# Patient Record
Sex: MALE | URBAN - METROPOLITAN AREA
[De-identification: names, ages, dates, MRNs, and addresses within clinical notes are randomized per-mention and may not be internally consistent; named-entity substitution may affect disease eponyms.]

---

## 2018-02-13 ENCOUNTER — APPOINTMENT (RX ONLY)
Dept: URBAN - METROPOLITAN AREA CLINIC 87 | Facility: CLINIC | Age: 68
Setting detail: DERMATOLOGY
End: 2018-02-13

## 2018-02-13 DIAGNOSIS — Z48.817 ENCOUNTER FOR SURGICAL AFTERCARE FOLLOWING SURGERY ON THE SKIN AND SUBCUTANEOUS TISSUE: ICD-10-CM

## 2018-02-13 PROCEDURE — ? COUNSELING

## 2018-02-13 PROCEDURE — ? POST-OP WOUND EVALUATION

## 2018-02-13 ASSESSMENT — LOCATION ZONE DERM: LOCATION ZONE: EAR

## 2018-02-13 ASSESSMENT — LOCATION DETAILED DESCRIPTION DERM: LOCATION DETAILED: LEFT CYMBA CONCHA

## 2018-02-13 ASSESSMENT — LOCATION SIMPLE DESCRIPTION DERM: LOCATION SIMPLE: LEFT EAR

## 2018-02-13 NOTE — PROCEDURE: POST-OP WOUND EVALUATION
Sutures?: intact
Wound Diameter In Cm(Optional): 0
Add 13953 Cpt? (Important Note: In 2017 The Use Of 46403 Is Being Tracked By Cms To Determine Future Global Period Reimbursement For Global Periods): no
Wound Crusting?: clean
Follow Up Units (Optional): -
Detail Level: Detailed

## 2018-02-14 ENCOUNTER — APPOINTMENT (RX ONLY)
Dept: URBAN - METROPOLITAN AREA CLINIC 87 | Facility: CLINIC | Age: 68
Setting detail: DERMATOLOGY
End: 2018-02-14

## 2018-02-14 DIAGNOSIS — L57.0 ACTINIC KERATOSIS: ICD-10-CM

## 2018-02-14 DIAGNOSIS — Z85.828 PERSONAL HISTORY OF OTHER MALIGNANT NEOPLASM OF SKIN: ICD-10-CM

## 2018-02-14 DIAGNOSIS — L82.1 OTHER SEBORRHEIC KERATOSIS: ICD-10-CM

## 2018-02-14 DIAGNOSIS — L90.5 SCAR CONDITIONS AND FIBROSIS OF SKIN: ICD-10-CM

## 2018-02-14 PROCEDURE — 17000 DESTRUCT PREMALG LESION: CPT

## 2018-02-14 PROCEDURE — 17003 DESTRUCT PREMALG LES 2-14: CPT

## 2018-02-14 PROCEDURE — ? LIQUID NITROGEN

## 2018-02-14 PROCEDURE — ? COUNSELING

## 2018-02-14 PROCEDURE — 99213 OFFICE O/P EST LOW 20 MIN: CPT | Mod: 25

## 2018-02-14 ASSESSMENT — LOCATION ZONE DERM
LOCATION ZONE: EAR
LOCATION ZONE: ARM
LOCATION ZONE: FACE

## 2018-02-14 ASSESSMENT — LOCATION DETAILED DESCRIPTION DERM
LOCATION DETAILED: LEFT MEDIAL ZYGOMA
LOCATION DETAILED: RIGHT TRIANGULAR FOSSA
LOCATION DETAILED: RIGHT INFERIOR FOREHEAD
LOCATION DETAILED: RIGHT CAVUM CONCHA
LOCATION DETAILED: LEFT DISTAL DORSAL FOREARM
LOCATION DETAILED: RIGHT DISTAL DORSAL FOREARM
LOCATION DETAILED: LEFT ANTIHELIX

## 2018-02-14 ASSESSMENT — LOCATION SIMPLE DESCRIPTION DERM
LOCATION SIMPLE: LEFT ZYGOMA
LOCATION SIMPLE: RIGHT EAR
LOCATION SIMPLE: RIGHT FOREHEAD
LOCATION SIMPLE: LEFT FOREARM
LOCATION SIMPLE: RIGHT FOREARM
LOCATION SIMPLE: LEFT EAR

## 2018-02-14 NOTE — PROCEDURE: LIQUID NITROGEN
Post-Care Instructions: I reviewed with the patient in detail post-care instructions. Patient is to wear sunprotection, and avoid picking at any of the treated lesions. Pt may apply Vaseline to crusted or scabbing areas.
Consent: The patient's consent was obtained including but not limited to risks of crusting, scabbing, blistering, scarring, darker or lighter pigmentary change, recurrence, incomplete removal and infection.
Detail Level: Zone
Total Number Of Aks Treated: 5
Render Post-Care Instructions In Note?: no
Duration Of Freeze Thaw-Cycle (Seconds): 0

## 2018-10-23 ENCOUNTER — APPOINTMENT (RX ONLY)
Dept: URBAN - METROPOLITAN AREA CLINIC 87 | Facility: CLINIC | Age: 68
Setting detail: DERMATOLOGY
End: 2018-10-23

## 2018-10-23 DIAGNOSIS — L57.0 ACTINIC KERATOSIS: ICD-10-CM

## 2018-10-23 DIAGNOSIS — D22 MELANOCYTIC NEVI: ICD-10-CM

## 2018-10-23 DIAGNOSIS — Z85.828 PERSONAL HISTORY OF OTHER MALIGNANT NEOPLASM OF SKIN: ICD-10-CM

## 2018-10-23 DIAGNOSIS — L91.8 OTHER HYPERTROPHIC DISORDERS OF THE SKIN: ICD-10-CM

## 2018-10-23 DIAGNOSIS — L57.8 OTHER SKIN CHANGES DUE TO CHRONIC EXPOSURE TO NONIONIZING RADIATION: ICD-10-CM

## 2018-10-23 DIAGNOSIS — L82.1 OTHER SEBORRHEIC KERATOSIS: ICD-10-CM

## 2018-10-23 DIAGNOSIS — D18.0 HEMANGIOMA: ICD-10-CM

## 2018-10-23 DIAGNOSIS — L81.4 OTHER MELANIN HYPERPIGMENTATION: ICD-10-CM

## 2018-10-23 DIAGNOSIS — Z71.89 OTHER SPECIFIED COUNSELING: ICD-10-CM

## 2018-10-23 PROBLEM — D22.62 MELANOCYTIC NEVI OF LEFT UPPER LIMB, INCLUDING SHOULDER: Status: ACTIVE | Noted: 2018-10-23

## 2018-10-23 PROBLEM — D18.01 HEMANGIOMA OF SKIN AND SUBCUTANEOUS TISSUE: Status: ACTIVE | Noted: 2018-10-23

## 2018-10-23 PROBLEM — D48.5 NEOPLASM OF UNCERTAIN BEHAVIOR OF SKIN: Status: ACTIVE | Noted: 2018-10-23

## 2018-10-23 PROCEDURE — ? PRESCRIPTION

## 2018-10-23 PROCEDURE — ? DEFER

## 2018-10-23 PROCEDURE — ? COUNSELING

## 2018-10-23 PROCEDURE — ? SUNSCREEN RECOMMENDATIONS

## 2018-10-23 PROCEDURE — 99214 OFFICE O/P EST MOD 30 MIN: CPT

## 2018-10-23 RX ORDER — INGENOL MEBUTATE 150 UG/G
GEL TOPICAL
Qty: 1 | Refills: 0 | Status: ERX | COMMUNITY
Start: 2018-10-23

## 2018-10-23 RX ADMIN — INGENOL MEBUTATE: 150 GEL TOPICAL at 00:00

## 2018-10-23 ASSESSMENT — LOCATION DETAILED DESCRIPTION DERM
LOCATION DETAILED: LEFT ULNAR DORSAL HAND
LOCATION DETAILED: LEFT ULNAR PALM
LOCATION DETAILED: RIGHT SUPERIOR ANTERIOR NECK
LOCATION DETAILED: LEFT RADIAL DORSAL HAND
LOCATION DETAILED: RIGHT LATERAL UPPER BACK
LOCATION DETAILED: INFERIOR MID FOREHEAD
LOCATION DETAILED: LEFT ANTERIOR PROXIMAL THIGH

## 2018-10-23 ASSESSMENT — LOCATION ZONE DERM
LOCATION ZONE: LEG
LOCATION ZONE: HAND
LOCATION ZONE: NECK
LOCATION ZONE: TRUNK
LOCATION ZONE: FACE

## 2018-10-23 ASSESSMENT — LOCATION SIMPLE DESCRIPTION DERM
LOCATION SIMPLE: LEFT THIGH
LOCATION SIMPLE: LEFT HAND
LOCATION SIMPLE: RIGHT ANTERIOR NECK
LOCATION SIMPLE: RIGHT UPPER BACK
LOCATION SIMPLE: INFERIOR FOREHEAD

## 2018-10-23 NOTE — PROCEDURE: DEFER
Procedure To Be Performed At Next Visit: Biopsy by shave method
Detail Level: Detailed
Instructions (Optional): 1.4
Instructions (Optional): 0.6

## 2018-10-26 RX ORDER — INGENOL MEBUTATE 150 UG/G
GEL TOPICAL
Qty: 1 | Refills: 0 | Status: ERX

## 2018-11-13 ENCOUNTER — APPOINTMENT (RX ONLY)
Dept: URBAN - METROPOLITAN AREA CLINIC 87 | Facility: CLINIC | Age: 68
Setting detail: DERMATOLOGY
End: 2018-11-13

## 2018-11-13 DIAGNOSIS — L57.0 ACTINIC KERATOSIS: ICD-10-CM

## 2018-11-13 DIAGNOSIS — D22 MELANOCYTIC NEVI: ICD-10-CM

## 2018-11-13 PROBLEM — D48.5 NEOPLASM OF UNCERTAIN BEHAVIOR OF SKIN: Status: ACTIVE | Noted: 2018-11-13

## 2018-11-13 PROCEDURE — ? BIOPSY BY SHAVE METHOD

## 2018-11-13 PROCEDURE — 11101: CPT

## 2018-11-13 PROCEDURE — 99213 OFFICE O/P EST LOW 20 MIN: CPT | Mod: 25

## 2018-11-13 PROCEDURE — 11100: CPT

## 2018-11-13 PROCEDURE — ? COUNSELING

## 2018-11-13 PROCEDURE — ? TREATMENT REGIMEN

## 2018-11-13 ASSESSMENT — LOCATION DETAILED DESCRIPTION DERM
LOCATION DETAILED: RIGHT LATERAL UPPER BACK
LOCATION DETAILED: LEFT ANTERIOR PROXIMAL THIGH
LOCATION DETAILED: LEFT MEDIAL FOREHEAD

## 2018-11-13 ASSESSMENT — LOCATION ZONE DERM
LOCATION ZONE: LEG
LOCATION ZONE: FACE
LOCATION ZONE: TRUNK

## 2018-11-13 ASSESSMENT — LOCATION SIMPLE DESCRIPTION DERM
LOCATION SIMPLE: LEFT THIGH
LOCATION SIMPLE: RIGHT UPPER BACK
LOCATION SIMPLE: LEFT FOREHEAD

## 2018-11-13 NOTE — PROCEDURE: BIOPSY BY SHAVE METHOD
Curettage Text: The wound bed was treated with curettage after the biopsy was performed.
X Size Of Lesion In Cm: 0
Post-Care Instructions: I reviewed with the patient in detail post-care instructions. Patient is to keep the biopsy site dry overnight, and then clean with gentle soap and water and apply Vaseline or aquaphor twice daily until healed. Patient may apply hydrogen peroxide soaks to remove any crusting.
Type Of Destruction Used: Curettage
Biopsy Method: double edge Personna blade
Anesthesia Volume In Cc (Will Not Render If 0): 0.5
Cryotherapy Text: The wound bed was treated with cryotherapy after the biopsy was performed.
Consent: Written consent was obtained and risks were reviewed including but not limited to scarring, infection, bleeding, scabbing, incomplete removal, nerve damage and allergy to anesthesia.
Dressing: Band-Aid
Electrodesiccation Text: The wound bed was treated with electrodesiccation after the biopsy was performed.
Biopsy Type: H and E
Hemostasis: Aluminum Chloride
Bill For Surgical Tray: no
Depth Of Biopsy: dermis
Electrodesiccation And Curettage Text: The wound bed was treated with electrodesiccation and curettage after the biopsy was performed.
Was A Bandage Applied: Yes
Silver Nitrate Text: The wound bed was treated with silver nitrate after the biopsy was performed.
Notification Instructions: Patient will be notified of biopsy results. However, patient instructed to call the office if not contacted within 2 weeks.
Size Of Lesion In Cm: 0.6
Detail Level: Detailed
Billing Type: Third-Party Bill
Anesthesia Type: 1% lidocaine with epinephrine
Wound Care: Petrolatum
Size Of Lesion In Cm: 1.4

## 2018-11-27 ENCOUNTER — APPOINTMENT (RX ONLY)
Dept: URBAN - METROPOLITAN AREA CLINIC 87 | Facility: CLINIC | Age: 68
Setting detail: DERMATOLOGY
End: 2018-11-27

## 2018-11-27 DIAGNOSIS — L82.1 OTHER SEBORRHEIC KERATOSIS: ICD-10-CM

## 2018-11-27 DIAGNOSIS — L57.0 ACTINIC KERATOSIS: ICD-10-CM

## 2018-11-27 PROCEDURE — 17003 DESTRUCT PREMALG LES 2-14: CPT

## 2018-11-27 PROCEDURE — ? COUNSELING

## 2018-11-27 PROCEDURE — 99212 OFFICE O/P EST SF 10 MIN: CPT | Mod: 25

## 2018-11-27 PROCEDURE — ? LIQUID NITROGEN

## 2018-11-27 PROCEDURE — 17000 DESTRUCT PREMALG LESION: CPT

## 2018-11-27 ASSESSMENT — LOCATION SIMPLE DESCRIPTION DERM
LOCATION SIMPLE: LEFT EAR
LOCATION SIMPLE: RIGHT FOREHEAD
LOCATION SIMPLE: LEFT TEMPLE
LOCATION SIMPLE: RIGHT TEMPLE
LOCATION SIMPLE: LEFT FOREHEAD

## 2018-11-27 ASSESSMENT — LOCATION DETAILED DESCRIPTION DERM
LOCATION DETAILED: LEFT INFERIOR LATERAL FOREHEAD
LOCATION DETAILED: RIGHT INFERIOR TEMPLE
LOCATION DETAILED: LEFT MID TEMPLE
LOCATION DETAILED: RIGHT FOREHEAD
LOCATION DETAILED: LEFT EXTERNAL AUDITORY CANAL
LOCATION DETAILED: LEFT CENTRAL TEMPLE

## 2018-11-27 ASSESSMENT — LOCATION ZONE DERM
LOCATION ZONE: EAR
LOCATION ZONE: FACE

## 2018-12-18 ENCOUNTER — APPOINTMENT (RX ONLY)
Dept: URBAN - METROPOLITAN AREA CLINIC 87 | Facility: CLINIC | Age: 68
Setting detail: DERMATOLOGY
End: 2018-12-18

## 2018-12-18 DIAGNOSIS — L57.0 ACTINIC KERATOSIS: ICD-10-CM

## 2018-12-18 PROCEDURE — ? DEFER

## 2018-12-18 PROCEDURE — 99212 OFFICE O/P EST SF 10 MIN: CPT

## 2018-12-18 PROCEDURE — ? TREATMENT REGIMEN

## 2018-12-18 PROCEDURE — ? COUNSELING

## 2018-12-18 NOTE — PROCEDURE: TREATMENT REGIMEN
Detail Level: Zone
Plan: The patient was treating topically with Picato on the face once daily at night for 2 days 6 weeks ago for actinic damage and denies complications with the treatment. The patient was advised no further treatment is necessary at present time upon evaluation no residual scaly papule were seen.

## 2018-12-18 NOTE — PROCEDURE: DEFER
Procedure To Be Performed At Next Visit: Liquid nitrogen
Detail Level: Detailed
Instructions (Optional): Left temple and nose exhibits residual erythematous scaly papules

## 2019-11-12 ENCOUNTER — APPOINTMENT (RX ONLY)
Dept: URBAN - METROPOLITAN AREA CLINIC 87 | Facility: CLINIC | Age: 69
Setting detail: DERMATOLOGY
End: 2019-11-12

## 2019-11-12 DIAGNOSIS — Z71.89 OTHER SPECIFIED COUNSELING: ICD-10-CM

## 2019-11-12 DIAGNOSIS — L82.1 OTHER SEBORRHEIC KERATOSIS: ICD-10-CM

## 2019-11-12 DIAGNOSIS — L81.4 OTHER MELANIN HYPERPIGMENTATION: ICD-10-CM

## 2019-11-12 DIAGNOSIS — L57.8 OTHER SKIN CHANGES DUE TO CHRONIC EXPOSURE TO NONIONIZING RADIATION: ICD-10-CM

## 2019-11-12 DIAGNOSIS — L57.0 ACTINIC KERATOSIS: ICD-10-CM

## 2019-11-12 DIAGNOSIS — Z85.828 PERSONAL HISTORY OF OTHER MALIGNANT NEOPLASM OF SKIN: ICD-10-CM

## 2019-11-12 DIAGNOSIS — D22 MELANOCYTIC NEVI: ICD-10-CM

## 2019-11-12 DIAGNOSIS — D18.0 HEMANGIOMA: ICD-10-CM

## 2019-11-12 PROBLEM — D22.62 MELANOCYTIC NEVI OF LEFT UPPER LIMB, INCLUDING SHOULDER: Status: ACTIVE | Noted: 2019-11-12

## 2019-11-12 PROBLEM — D18.01 HEMANGIOMA OF SKIN AND SUBCUTANEOUS TISSUE: Status: ACTIVE | Noted: 2019-11-12

## 2019-11-12 PROCEDURE — ? LIQUID NITROGEN

## 2019-11-12 PROCEDURE — 99214 OFFICE O/P EST MOD 30 MIN: CPT | Mod: 25

## 2019-11-12 PROCEDURE — ? PRESCRIPTION

## 2019-11-12 PROCEDURE — ? SUNSCREEN RECOMMENDATIONS

## 2019-11-12 PROCEDURE — 17003 DESTRUCT PREMALG LES 2-14: CPT

## 2019-11-12 PROCEDURE — 17000 DESTRUCT PREMALG LESION: CPT

## 2019-11-12 PROCEDURE — ? COUNSELING

## 2019-11-12 PROCEDURE — ? INVENTORY

## 2019-11-12 RX ORDER — INGENOL MEBUTATE 150 UG/G
GEL TOPICAL
Qty: 1 | Refills: 1 | Status: ERX

## 2019-11-12 ASSESSMENT — LOCATION SIMPLE DESCRIPTION DERM
LOCATION SIMPLE: RIGHT FOREARM
LOCATION SIMPLE: LEFT HAND
LOCATION SIMPLE: LEFT CHEEK

## 2019-11-12 ASSESSMENT — LOCATION ZONE DERM
LOCATION ZONE: FACE
LOCATION ZONE: ARM
LOCATION ZONE: HAND

## 2019-11-12 ASSESSMENT — LOCATION DETAILED DESCRIPTION DERM
LOCATION DETAILED: RIGHT PROXIMAL DORSAL FOREARM
LOCATION DETAILED: LEFT RADIAL DORSAL HAND
LOCATION DETAILED: RIGHT DISTAL RADIAL DORSAL FOREARM
LOCATION DETAILED: LEFT ULNAR DORSAL HAND
LOCATION DETAILED: LEFT SUPERIOR CENTRAL MALAR CHEEK
LOCATION DETAILED: LEFT ULNAR PALM

## 2019-11-12 NOTE — PROCEDURE: LIQUID NITROGEN
Consent: The patient's consent was obtained including but not limited to risks of crusting, scabbing, blistering, scarring, darker or lighter pigmentary change, recurrence, incomplete removal and infection.
Render Post-Care Instructions In Note?: yes
Number Of Freeze-Thaw Cycles: 2 freeze-thaw cycles
Detail Level: Simple
Render Note In Bullet Format When Appropriate: No
Post-Care Instructions: POST-PROCEDURE CARE\\nThe procedure you have just had using liquid nitrogen is called cryosurgery. Liquid nitrogen is minus (-) 195.8O C and must be stored in special\\ncontainers. It evaporates on contact with the air and becomes water vapor, which is why it appears to smoke.\\n\\nCryosurgery is a surgical technique that avoids cutting, burning or the need for anesthesia. For selected lesions or growths, cryosurgery is the best\\ntreatment because of its safety, minimal post-surgical care and excellent cosmetic results.\\n\\nFollowing treatment, the lesion or growth will appear unchanged. Soon afterwards, the area will become red and slightly swollen. Within 24 to 48\\nhours, a blister or water bubble often appears. THIS IS NORMAL AND EXPECTED. If left alone, the blister will slowly resolve and the entire area\\nwill turn into a scab. Regardless of whether the blister breaks or not, the healing will continue as expected. The scab will fall off by itself when it is\\nready. From the day of cryosurgery to the day when the scab falls off is usually about three weeks. A faint pink spot will be present that will slowly\\nfade away.\\n\\nYou may carry on normal activities immediately after therapy including bathing. There are no restrictions, however, you should be careful not to\\nirritate or traumatize the area.\\n\\nWhen used for Wart Treatment:\\nWhen cryotherapy is used for warts, keep in mind that warts may be very stubborn! The virus causing the wart may be a strong strain, so the\\ntreatment may need to be repeated. Usually you will know if the wart is still present within three to four weeks, so you may return for another\\ntreatment. It is normal to expect blistering which may fill with blood and appear black.
Duration Of Freeze Thaw-Cycle (Seconds): 15

## 2020-11-03 ENCOUNTER — APPOINTMENT (RX ONLY)
Dept: URBAN - METROPOLITAN AREA CLINIC 87 | Facility: CLINIC | Age: 70
Setting detail: DERMATOLOGY
End: 2020-11-03

## 2020-11-03 VITALS — TEMPERATURE: 98.4 F

## 2020-11-03 DIAGNOSIS — Z71.89 OTHER SPECIFIED COUNSELING: ICD-10-CM

## 2020-11-03 DIAGNOSIS — L91.8 OTHER HYPERTROPHIC DISORDERS OF THE SKIN: ICD-10-CM

## 2020-11-03 DIAGNOSIS — D18.0 HEMANGIOMA: ICD-10-CM

## 2020-11-03 DIAGNOSIS — Z85.828 PERSONAL HISTORY OF OTHER MALIGNANT NEOPLASM OF SKIN: ICD-10-CM

## 2020-11-03 DIAGNOSIS — L57.0 ACTINIC KERATOSIS: ICD-10-CM

## 2020-11-03 DIAGNOSIS — L81.4 OTHER MELANIN HYPERPIGMENTATION: ICD-10-CM

## 2020-11-03 DIAGNOSIS — L82.1 OTHER SEBORRHEIC KERATOSIS: ICD-10-CM

## 2020-11-03 DIAGNOSIS — L85.3 XEROSIS CUTIS: ICD-10-CM

## 2020-11-03 DIAGNOSIS — D22 MELANOCYTIC NEVI: ICD-10-CM

## 2020-11-03 DIAGNOSIS — L57.8 OTHER SKIN CHANGES DUE TO CHRONIC EXPOSURE TO NONIONIZING RADIATION: ICD-10-CM

## 2020-11-03 PROBLEM — D48.5 NEOPLASM OF UNCERTAIN BEHAVIOR OF SKIN: Status: ACTIVE | Noted: 2020-11-03

## 2020-11-03 PROBLEM — D18.01 HEMANGIOMA OF SKIN AND SUBCUTANEOUS TISSUE: Status: ACTIVE | Noted: 2020-11-03

## 2020-11-03 PROBLEM — D22.62 MELANOCYTIC NEVI OF LEFT UPPER LIMB, INCLUDING SHOULDER: Status: ACTIVE | Noted: 2020-11-03

## 2020-11-03 PROCEDURE — 99214 OFFICE O/P EST MOD 30 MIN: CPT | Mod: 25

## 2020-11-03 PROCEDURE — ? DEFER

## 2020-11-03 PROCEDURE — ? SUNSCREEN RECOMMENDATIONS

## 2020-11-03 PROCEDURE — ? BIOPSY BY PUNCH METHOD

## 2020-11-03 PROCEDURE — ? ADDITIONAL NOTES

## 2020-11-03 PROCEDURE — ? COUNSELING

## 2020-11-03 PROCEDURE — 69100 BIOPSY OF EXTERNAL EAR: CPT

## 2020-11-03 ASSESSMENT — LOCATION SIMPLE DESCRIPTION DERM
LOCATION SIMPLE: LEFT ANTERIOR NECK
LOCATION SIMPLE: LEFT HAND
LOCATION SIMPLE: LEFT CHEEK

## 2020-11-03 ASSESSMENT — LOCATION DETAILED DESCRIPTION DERM
LOCATION DETAILED: LEFT SUPERIOR ANTERIOR NECK
LOCATION DETAILED: LEFT CENTRAL MALAR CHEEK
LOCATION DETAILED: LEFT ULNAR PALM
LOCATION DETAILED: LEFT ULNAR DORSAL HAND
LOCATION DETAILED: LEFT RADIAL DORSAL HAND
LOCATION DETAILED: LEFT MEDIAL MALAR CHEEK

## 2020-11-03 ASSESSMENT — LOCATION ZONE DERM
LOCATION ZONE: NECK
LOCATION ZONE: HAND
LOCATION ZONE: FACE

## 2020-11-03 NOTE — PROCEDURE: BIOPSY BY PUNCH METHOD
Detail Level: Detailed
Was A Bandage Applied: Yes
Punch Size In Mm: 3
Biopsy Type: H and E
Anesthesia Type: 1% lidocaine with epinephrine
Anesthesia Volume In Cc (Will Not Render If 0): 0.5
Additional Anesthesia Volume In Cc (Will Not Render If 0): 0
Hemostasis: None
Epidermal Sutures: 5-0 Nylon
Wound Care: Petrolatum
Dressing: bandage
Suture Removal: 7 days
Patient Will Remove Sutures At Home?: No
Size Of Lesion In Cm (Optional): 1
Lab: 6
Consent: Written consent was obtained and risks were reviewed including but not limited to scarring, infection, bleeding, scabbing, incomplete removal, nerve damage and allergy to anesthesia.
Post-Care Instructions: I reviewed with the patient in detail post-care instructions. Patient is to keep the biopsy site dry overnight, and then apply bacitracin twice daily until healed. Patient may apply hydrogen peroxide soaks to remove any crusting.
Home Suture Removal Text: Patient was provided a home suture removal kit and will remove their sutures at home.  If they have any questions or difficulties they will call the office.
Notification Instructions: Patient will be notified of biopsy results. However, patient instructed to call the office if not contacted within 2 weeks.
Billing Type: Third-Party Bill
Information: Selecting Yes will display possible errors in your note based on the variables you have selected. This validation is only offered as a suggestion for you. PLEASE NOTE THAT THE VALIDATION TEXT WILL BE REMOVED WHEN YOU FINALIZE YOUR NOTE. IF YOU WANT TO FAX A PRELIMINARY NOTE YOU WILL NEED TO TOGGLE THIS TO 'NO' IF YOU DO NOT WANT IT IN YOUR FAXED NOTE.

## 2020-11-11 ENCOUNTER — APPOINTMENT (RX ONLY)
Dept: URBAN - METROPOLITAN AREA CLINIC 87 | Facility: CLINIC | Age: 70
Setting detail: DERMATOLOGY
End: 2020-11-11

## 2020-11-11 VITALS — TEMPERATURE: 97.7 F

## 2020-11-11 DIAGNOSIS — L57.0 ACTINIC KERATOSIS: ICD-10-CM

## 2020-11-11 DIAGNOSIS — Z48.02 ENCOUNTER FOR REMOVAL OF SUTURES: ICD-10-CM

## 2020-11-11 PROBLEM — D04.22 CARCINOMA IN SITU OF SKIN OF LEFT EAR AND EXTERNAL AURICULAR CANAL: Status: ACTIVE | Noted: 2020-11-11

## 2020-11-11 PROCEDURE — 99024 POSTOP FOLLOW-UP VISIT: CPT

## 2020-11-11 PROCEDURE — 17003 DESTRUCT PREMALG LES 2-14: CPT

## 2020-11-11 PROCEDURE — ? DEFER

## 2020-11-11 PROCEDURE — 17000 DESTRUCT PREMALG LESION: CPT

## 2020-11-11 PROCEDURE — 99213 OFFICE O/P EST LOW 20 MIN: CPT | Mod: 25

## 2020-11-11 PROCEDURE — ? LIQUID NITROGEN

## 2020-11-11 PROCEDURE — ? COUNSELING

## 2020-11-11 PROCEDURE — ? SUTURE REMOVAL (GLOBAL PERIOD)

## 2020-11-11 ASSESSMENT — LOCATION DETAILED DESCRIPTION DERM
LOCATION DETAILED: LEFT LATERAL MALAR CHEEK
LOCATION DETAILED: LEFT FOREHEAD
LOCATION DETAILED: LEFT CENTRAL TEMPLE
LOCATION DETAILED: LEFT MEDIAL MALAR CHEEK
LOCATION DETAILED: LEFT CENTRAL MALAR CHEEK
LOCATION DETAILED: LEFT CAVUM CONCHA
LOCATION DETAILED: LEFT SUPERIOR LATERAL MALAR CHEEK

## 2020-11-11 ASSESSMENT — LOCATION SIMPLE DESCRIPTION DERM
LOCATION SIMPLE: LEFT EAR
LOCATION SIMPLE: LEFT TEMPLE
LOCATION SIMPLE: LEFT CHEEK
LOCATION SIMPLE: LEFT FOREHEAD

## 2020-11-11 ASSESSMENT — LOCATION ZONE DERM
LOCATION ZONE: EAR
LOCATION ZONE: FACE

## 2020-11-11 NOTE — PROCEDURE: LIQUID NITROGEN
Detail Level: Simple
Render Note In Bullet Format When Appropriate: No
Post-Care Instructions: POST-PROCEDURE CARE\\nThe procedure you have just had using liquid nitrogen is called cryosurgery. Liquid nitrogen is minus (-) 195.8O C and must be stored in special\\ncontainers. It evaporates on contact with the air and becomes water vapor, which is why it appears to smoke.\\n\\nCryosurgery is a surgical technique that avoids cutting, burning or the need for anesthesia. For selected lesions or growths, cryosurgery is the best\\ntreatment because of its safety, minimal post-surgical care and excellent cosmetic results.\\n\\nFollowing treatment, the lesion or growth will appear unchanged. Soon afterwards, the area will become red and slightly swollen. Within 24 to 48\\nhours, a blister or water bubble often appears. THIS IS NORMAL AND EXPECTED. If left alone, the blister will slowly resolve and the entire area\\nwill turn into a scab. Regardless of whether the blister breaks or not, the healing will continue as expected. The scab will fall off by itself when it is\\nready. From the day of cryosurgery to the day when the scab falls off is usually about three weeks. A faint pink spot will be present that will slowly\\nfade away.\\n\\nYou may carry on normal activities immediately after therapy including bathing. There are no restrictions, however, you should be careful not to\\nirritate or traumatize the area.\\n\\nWhen used for Wart Treatment:\\nWhen cryotherapy is used for warts, keep in mind that warts may be very stubborn! The virus causing the wart may be a strong strain, so the\\ntreatment may need to be repeated. Usually you will know if the wart is still present within three to four weeks, so you may return for another\\ntreatment. It is normal to expect blistering which may fill with blood and appear black.
Number Of Freeze-Thaw Cycles: 2 freeze-thaw cycles
Render Post-Care Instructions In Note?: yes
Duration Of Freeze Thaw-Cycle (Seconds): 15
Consent: The patient's consent was obtained including but not limited to risks of crusting, scabbing, blistering, scarring, darker or lighter pigmentary change, recurrence, incomplete removal and infection.

## 2020-11-11 NOTE — PROCEDURE: SUTURE REMOVAL (GLOBAL PERIOD)
Detail Level: Detailed
Add 18701 Cpt? (Important Note: In 2017 The Use Of 18559 Is Being Tracked By Cms To Determine Future Global Period Reimbursement For Global Periods): yes

## 2020-11-11 NOTE — PROCEDURE: DEFER
Introduction Text (Please End With A Colon): The following procedure was deferred:
Detail Level: Detailed
Instructions (Optional): Patient to s/c MOHS w/ Dr. Caldwell
Procedure To Be Performed At Next Visit: Mohs surgery

## 2020-11-13 ENCOUNTER — APPOINTMENT (RX ONLY)
Dept: URBAN - METROPOLITAN AREA CLINIC 87 | Facility: CLINIC | Age: 70
Setting detail: DERMATOLOGY
End: 2020-11-13

## 2020-11-13 VITALS — SYSTOLIC BLOOD PRESSURE: 158 MMHG | HEART RATE: 63 BPM | DIASTOLIC BLOOD PRESSURE: 93 MMHG

## 2020-11-13 PROBLEM — D04.22 CARCINOMA IN SITU OF SKIN OF LEFT EAR AND EXTERNAL AURICULAR CANAL: Status: ACTIVE | Noted: 2020-11-13

## 2020-11-13 PROCEDURE — 17311 MOHS 1 STAGE H/N/HF/G: CPT | Mod: 79

## 2020-11-13 PROCEDURE — ? MOHS SURGERY

## 2020-11-13 PROCEDURE — 13151 CMPLX RPR E/N/E/L 1.1-2.5 CM: CPT | Mod: 79

## 2020-11-13 PROCEDURE — ? REPAIR NOTE

## 2020-11-13 PROCEDURE — ? ADDITIONAL NOTES

## 2020-11-13 NOTE — PROCEDURE: MOHS SURGERY
Referred To Mid-Level For Closure Text (Leave Blank If You Do Not Want): After obtaining clear surgical margins the patient was sent to Aleksandr Del Valle PA-C for surgical repair.  The patient understands they will receive post-surgical care and follow-up from the mid-level provider.

## 2020-11-13 NOTE — PROCEDURE: MOHS SURGERY
Referred To Plastics For Closure Text (Leave Blank If You Do Not Want): After obtaining clear surgical margins the patient was sent to plastics for surgical repair.  The patient understands they will receive post-surgical care and follow-up from Dr. Qureshi.

## 2020-11-13 NOTE — PROCEDURE: REPAIR NOTE
What Is The Reason For Today's Visit?: Follow Up Melanoma Year Excised?: 04/2016 Epidermal Closure: running

## 2020-11-13 NOTE — PROCEDURE: MOHS SURGERY
breath sounds clear and equal bilaterally. Consent 1/Introductory Paragraph: The rationale for Mohs was explained to the patient and consent was obtained. The risks, benefits and alternatives to therapy were discussed in detail. Specifically, the risks of infection, scarring, pain, bleeding, prolonged wound healing, incomplete removal, allergy to anesthesia, nerve injury, recurrence and the possible need of delayed or additional reconstruction were addressed. Prior to the procedure, the treatment site was clearly identified and confirmed by the patient. All components of Universal Protocol/PAUSE Rule completed.

## 2022-10-25 ENCOUNTER — APPOINTMENT (RX ONLY)
Dept: URBAN - METROPOLITAN AREA CLINIC 87 | Facility: CLINIC | Age: 72
Setting detail: DERMATOLOGY
End: 2022-10-25

## 2022-10-25 DIAGNOSIS — D22 MELANOCYTIC NEVI: ICD-10-CM

## 2022-10-25 PROBLEM — D22.39 MELANOCYTIC NEVI OF OTHER PARTS OF FACE: Status: ACTIVE | Noted: 2022-10-25

## 2022-10-25 PROCEDURE — ? ADDITIONAL NOTES

## 2022-10-25 PROCEDURE — ? COUNSELING

## 2022-10-25 PROCEDURE — 11102 TANGNTL BX SKIN SINGLE LES: CPT

## 2022-10-25 PROCEDURE — ? TREATMENT REGIMEN

## 2022-10-25 PROCEDURE — ? BIOPSY BY SHAVE METHOD

## 2022-10-25 ASSESSMENT — LOCATION DETAILED DESCRIPTION DERM: LOCATION DETAILED: LEFT LATERAL ZYGOMA

## 2022-10-25 ASSESSMENT — LOCATION SIMPLE DESCRIPTION DERM: LOCATION SIMPLE: LEFT ZYGOMA

## 2022-10-25 ASSESSMENT — LOCATION ZONE DERM: LOCATION ZONE: FACE

## 2022-10-25 NOTE — PROCEDURE: MIPS QUALITY
Quality 402: Tobacco Use And Help With Quitting Among Adolescents: Patient screened for tobacco and never smoked
Detail Level: Detailed
Quality 431: Preventive Care And Screening: Unhealthy Alcohol Use - Screening: Patient not identified as an unhealthy alcohol user when screened for unhealthy alcohol use using a systematic screening method
Quality 130: Documentation Of Current Medications In The Medical Record: Current Medications Documented
Quality 226: Preventive Care And Screening: Tobacco Use: Screening And Cessation Intervention: Tobacco Screening not Performed for Unknown Reasons

## 2022-10-25 NOTE — PROCEDURE: BIOPSY BY SHAVE METHOD

## 2022-11-22 ENCOUNTER — APPOINTMENT (RX ONLY)
Dept: URBAN - METROPOLITAN AREA CLINIC 87 | Facility: CLINIC | Age: 72
Setting detail: DERMATOLOGY
End: 2022-11-22

## 2022-11-22 DIAGNOSIS — L81.1 CHLOASMA: ICD-10-CM

## 2022-11-22 DIAGNOSIS — L82.1 OTHER SEBORRHEIC KERATOSIS: ICD-10-CM

## 2022-11-22 PROCEDURE — 99213 OFFICE O/P EST LOW 20 MIN: CPT

## 2022-11-22 PROCEDURE — ? PRESCRIPTION

## 2022-11-22 PROCEDURE — ? COUNSELING

## 2022-11-22 RX ORDER — HYDROQUINONE 40 MG/G
CREAM TOPICAL QHS
Qty: 28.4 | Refills: 2 | Status: ERX | COMMUNITY
Start: 2022-11-22

## 2022-11-22 RX ADMIN — HYDROQUINONE: 40 CREAM TOPICAL at 00:00

## 2022-11-22 ASSESSMENT — LOCATION SIMPLE DESCRIPTION DERM
LOCATION SIMPLE: LEFT ZYGOMA
LOCATION SIMPLE: RIGHT CHEEK

## 2022-11-22 ASSESSMENT — LOCATION DETAILED DESCRIPTION DERM
LOCATION DETAILED: RIGHT CENTRAL MALAR CHEEK
LOCATION DETAILED: LEFT LATERAL ZYGOMA

## 2022-11-22 ASSESSMENT — LOCATION ZONE DERM: LOCATION ZONE: FACE

## 2024-04-02 ENCOUNTER — APPOINTMENT (RX ONLY)
Dept: URBAN - METROPOLITAN AREA CLINIC 87 | Facility: CLINIC | Age: 74
Setting detail: DERMATOLOGY
End: 2024-04-02

## 2024-04-02 DIAGNOSIS — D22 MELANOCYTIC NEVI: ICD-10-CM

## 2024-04-02 DIAGNOSIS — L81.4 OTHER MELANIN HYPERPIGMENTATION: ICD-10-CM

## 2024-04-02 DIAGNOSIS — D18.0 HEMANGIOMA: ICD-10-CM

## 2024-04-02 DIAGNOSIS — L82.1 OTHER SEBORRHEIC KERATOSIS: ICD-10-CM

## 2024-04-02 PROBLEM — D22.5 MELANOCYTIC NEVI OF TRUNK: Status: ACTIVE | Noted: 2024-04-02

## 2024-04-02 PROBLEM — D48.5 NEOPLASM OF UNCERTAIN BEHAVIOR OF SKIN: Status: ACTIVE | Noted: 2024-04-02

## 2024-04-02 PROBLEM — D18.01 HEMANGIOMA OF SKIN AND SUBCUTANEOUS TISSUE: Status: ACTIVE | Noted: 2024-04-02

## 2024-04-02 PROCEDURE — 99213 OFFICE O/P EST LOW 20 MIN: CPT | Mod: 25

## 2024-04-02 PROCEDURE — ? BIOPSY BY SHAVE METHOD

## 2024-04-02 PROCEDURE — ? TREATMENT REGIMEN

## 2024-04-02 PROCEDURE — 11102 TANGNTL BX SKIN SINGLE LES: CPT

## 2024-04-02 PROCEDURE — ? COUNSELING

## 2024-04-02 ASSESSMENT — LOCATION DETAILED DESCRIPTION DERM
LOCATION DETAILED: EPIGASTRIC SKIN
LOCATION DETAILED: PERIUMBILICAL SKIN
LOCATION DETAILED: LEFT ANTERIOR PROXIMAL UPPER ARM
LOCATION DETAILED: LEFT LATERAL MALAR CHEEK
LOCATION DETAILED: RIGHT POSTERIOR SHOULDER
LOCATION DETAILED: RIGHT CENTRAL MALAR CHEEK
LOCATION DETAILED: LEFT POSTERIOR SHOULDER
LOCATION DETAILED: SUPERIOR LUMBAR SPINE
LOCATION DETAILED: RIGHT ANTERIOR PROXIMAL UPPER ARM

## 2024-04-02 ASSESSMENT — LOCATION SIMPLE DESCRIPTION DERM
LOCATION SIMPLE: LEFT CHEEK
LOCATION SIMPLE: RIGHT SHOULDER
LOCATION SIMPLE: ABDOMEN
LOCATION SIMPLE: LEFT UPPER ARM
LOCATION SIMPLE: RIGHT CHEEK
LOCATION SIMPLE: RIGHT UPPER ARM
LOCATION SIMPLE: LEFT SHOULDER
LOCATION SIMPLE: LOWER BACK

## 2024-04-02 ASSESSMENT — LOCATION ZONE DERM
LOCATION ZONE: TRUNK
LOCATION ZONE: ARM
LOCATION ZONE: FACE

## 2024-04-02 NOTE — PROCEDURE: BIOPSY BY SHAVE METHOD
Detail Level: Detailed
Depth Of Biopsy: dermis
Was A Bandage Applied: Yes
Size Of Lesion In Cm: 0.9
X Size Of Lesion In Cm: 0.7
Biopsy Type: H and E
Biopsy Method: Dermablade
Anesthesia Type: 1% lidocaine with epinephrine
Anesthesia Volume In Cc: 0.5
Additional Anesthesia Volume In Cc (Will Not Render If 0): 0
Hemostasis: Electrocautery
Wound Care: Petrolatum
Dressing: bandage
Destruction After The Procedure: No
Type Of Destruction Used: Curettage
Curettage Text: The wound bed was treated with curettage after the biopsy was performed.
Cryotherapy Text: The wound bed was treated with cryotherapy after the biopsy was performed.
Electrodesiccation Text: The wound bed was treated with electrodesiccation after the biopsy was performed.
Electrodesiccation And Curettage Text: The wound bed was treated with electrodesiccation and curettage after the biopsy was performed.
Silver Nitrate Text: The wound bed was treated with silver nitrate after the biopsy was performed.
Lab: 6
Lab Facility: [id number]
Consent was obtained and risks were reviewed including but not limited to scarring, infection, bleeding, scabbing, incomplete removal, nerve damage and allergy to anesthesia.
Post-Care Instructions: I reviewed with the patient in detail post-care instructions. Patient is to keep the biopsy site dry overnight, and then apply bacitracin twice daily until healed. Patient may apply hydrogen peroxide soaks to remove any crusting.
Notification Instructions: Patient will be notified of biopsy results. However, patient instructed to call the office if not contacted within 2 weeks.
Billing Type: Third-Party Bill
Information: Selecting Yes will display possible errors in your note based on the variables you have selected. This validation is only offered as a suggestion for you. PLEASE NOTE THAT THE VALIDATION TEXT WILL BE REMOVED WHEN YOU FINALIZE YOUR NOTE. IF YOU WANT TO FAX A PRELIMINARY NOTE YOU WILL NEED TO TOGGLE THIS TO 'NO' IF YOU DO NOT WANT IT IN YOUR FAXED NOTE.

## 2024-04-24 ENCOUNTER — APPOINTMENT (RX ONLY)
Dept: URBAN - METROPOLITAN AREA CLINIC 87 | Facility: CLINIC | Age: 74
Setting detail: DERMATOLOGY
End: 2024-04-24

## 2024-04-24 DIAGNOSIS — L57.0 ACTINIC KERATOSIS: ICD-10-CM

## 2024-04-24 DIAGNOSIS — B07.8 OTHER VIRAL WARTS: ICD-10-CM | Status: INADEQUATELY CONTROLLED

## 2024-04-24 PROCEDURE — ? COUNSELING

## 2024-04-24 PROCEDURE — ? CANTHARIDIN

## 2024-04-24 PROCEDURE — 17000 DESTRUCT PREMALG LESION: CPT | Mod: 59

## 2024-04-24 PROCEDURE — ? LIQUID NITROGEN

## 2024-04-24 PROCEDURE — 17003 DESTRUCT PREMALG LES 2-14: CPT | Mod: 59

## 2024-04-24 PROCEDURE — 17110 DESTRUCTION B9 LES UP TO 14: CPT

## 2024-04-24 ASSESSMENT — LOCATION DETAILED DESCRIPTION DERM
LOCATION DETAILED: RIGHT DISTAL ULNAR THUMB
LOCATION DETAILED: LEFT MEDIAL SUPERIOR CHEST
LOCATION DETAILED: LEFT INFERIOR CENTRAL MALAR CHEEK

## 2024-04-24 ASSESSMENT — TOTAL NUMBER OF VERRUCA VULGARIS: # OF LESIONS?: 1

## 2024-04-24 ASSESSMENT — LOCATION ZONE DERM
LOCATION ZONE: TRUNK
LOCATION ZONE: FACE
LOCATION ZONE: FINGER

## 2024-04-24 ASSESSMENT — LOCATION SIMPLE DESCRIPTION DERM
LOCATION SIMPLE: CHEST
LOCATION SIMPLE: RIGHT THUMB
LOCATION SIMPLE: LEFT CHEEK

## 2024-04-24 NOTE — PROCEDURE: LIQUID NITROGEN
Number Of Freeze-Thaw Cycles: 3 freeze-thaw cycles
Duration Of Freeze Thaw-Cycle (Seconds): 0
Post-Care Instructions: I reviewed with the patient in detail post-care instructions. Patient is to wear sunprotection, and avoid picking at any of the treated lesions. Pt may apply Vaseline to crusted or scabbing areas.
Render Note In Bullet Format When Appropriate: No
Show Applicator Variable?: Yes
Consent: The patient's consent was obtained including but not limited to risks of crusting, scabbing, blistering, scarring, darker or lighter pigmentary change, recurrence, incomplete removal and infection.
Detail Level: Detailed
Medical Necessity Clause: This procedure was medically necessary because the lesions that were treated were:
Spray Paint Text: The liquid nitrogen was applied to the skin utilizing a spray paint frosting technique.
Medical Necessity Information: It is in your best interest to select a reason for this procedure from the list below. All of these items fulfill various CMS LCD requirements except the new and changing color options.

## 2024-06-14 NOTE — PROCEDURE: CANTHARIDIN
1
Cantharone Duration Text (Please Remove Duration From Postcare): The patient was instructed to leave the Cantharone on for 6-8 hours and then wash the area well with soap and water.
Include Z78.9 (Other Specified Conditions Influencing Health Status) As An Associated Diagnosis?: No
Cantharone Plus Duration Text (Please Remove Duration From Postcare): The patient was instructed to leave the Cantharone Plus on for 6-8 hours and then wash the area well with soap and water.
Consent: The patient's consent was obtained including but not limited to risks of crusting, scabbing, scarring, blistering, darker or lighter pigmentary change, recurrence, incomplete removal and infection.
Canthacur Duration Text (Please Remove Duration From Postcare): The patient was instructed to leave the Canthacur on for 6-8 hours and then wash the area well with soap and water.
Curette Text: Prior to application of cantharidin the lesions were lightly pared with a curette.
Medical Necessity Information: It is in your best interest to select a reason for this procedure from the list below. All of these items fulfill various CMS LCD requirements except the new and changing color options.
Cantharone Forte Duration Text (Please Remove Duration From Postcare): The patient was instructed to leave the Cantharone Forte on for 6-8 hours and then wash the area well with soap and water.
Detail Level: Detailed
Medical Necessity Clause: This procedure was medically necessary because the lesions that were treated were:
Post-Care Instructions: I reviewed with the patient in detail post-care instructions. The patient understands that the treated areas should be washed off 6 to 8 hours after application.
Canthacur Ps Duration Text (Please Remove Duration From Postcare): The patient was instructed to leave the Canthacur PS on for 6-8 hours and then wash the area well with soap and water.
Strength: [place]